# Patient Record
Sex: FEMALE
[De-identification: names, ages, dates, MRNs, and addresses within clinical notes are randomized per-mention and may not be internally consistent; named-entity substitution may affect disease eponyms.]

---

## 2022-12-17 ENCOUNTER — NURSE TRIAGE (OUTPATIENT)
Dept: OTHER | Facility: CLINIC | Age: 77
End: 2022-12-17

## 2022-12-18 NOTE — TELEPHONE ENCOUNTER
Location of patient: [place]    Subjective: Caller states \"Thursday at computer all day and had a pain on inside of my leg. Painful when touched. Getting up, hurts, walking helps to subside. I called urgent care, they said to talk to a nurse or go to ER. \"     Current Symptoms: tiny bit of discoloration, not swollen, a little warm, tender to touch. Denies chest pain, SOB, denies cold foot, streaking, fever    Associated Symptoms: NA    I have RA and Heart doctors. .. Pain Severity: 10+/10; sharp, shooting; constant. After walking it decreases the pain and feels numb and dense. Temperature: Denies     What has been tried: wore leggings - helps    Recommended disposition: Proceed to the emergency room or call your RA (internist) for a secondary opinion/triage. She does not have a PCP. Care advice provided, patient verbalizes understanding; denies any other questions or concerns.     Outcome: Patient/caller agrees to proceed to [facility] Emergency Department    Reason for Disposition   [1] Thigh or calf pain AND [2] only 1 side AND [3] present > 1 hour (Exception: chronic unchanged pain)    Protocols used: Leg Pain-ADULT-